# Patient Record
Sex: MALE | Race: BLACK OR AFRICAN AMERICAN | NOT HISPANIC OR LATINO | Employment: UNEMPLOYED | URBAN - METROPOLITAN AREA
[De-identification: names, ages, dates, MRNs, and addresses within clinical notes are randomized per-mention and may not be internally consistent; named-entity substitution may affect disease eponyms.]

---

## 2017-07-12 ENCOUNTER — OFFICE VISIT (OUTPATIENT)
Dept: FAMILY MEDICINE | Facility: CLINIC | Age: 27
End: 2017-07-12
Payer: COMMERCIAL

## 2017-07-12 VITALS
TEMPERATURE: 96.3 F | SYSTOLIC BLOOD PRESSURE: 106 MMHG | BODY MASS INDEX: 25.17 KG/M2 | HEART RATE: 63 BPM | RESPIRATION RATE: 18 BRPM | OXYGEN SATURATION: 98 % | DIASTOLIC BLOOD PRESSURE: 77 MMHG | WEIGHT: 182 LBS

## 2017-07-12 DIAGNOSIS — M25.532 LEFT WRIST PAIN: Primary | ICD-10-CM

## 2017-07-12 PROCEDURE — 99213 OFFICE O/P EST LOW 20 MIN: CPT | Performed by: FAMILY MEDICINE

## 2017-07-12 NOTE — MR AVS SNAPSHOT
After Visit Summary   7/12/2017    Patrick Francisco    MRN: [id number]           Patient Information     Date Of Birth          1990        Visit Information        Provider Department      7/12/2017 11:20 AM Shruti Eli MD [facility]        Today's Diagnoses     Left wrist pain    -  1       Follow-ups after your visit        Additional Services     [facility] PT, HAND, AND CHIROPRACTIC REFERRAL       **This order will print in the [facility] Scheduling Office**    Physical Therapy, Hand Therapy and Chiropractic Care are available through:    *[facility]  *[facility]  *[facility]    Call one number to schedule at any of the above locations: [phone].    Your provider has referred you to: Hand Therapy    Indication/Reason for Referral: Wrist Pain  Onset of Illness: June 2016  Therapy Orders: Evaluate and Treat  Special Programs: None  Special Request: None    Jeffry Teran      Additional Comments for the Therapist or Chiropractor: none    Please be aware that coverage of these services is subject to the terms and limitations of your health insurance plan.  Call member services at your health plan with any benefit or coverage questions.      Please bring the following to your appointment:    *Your personal calendar for scheduling future appointments  *Comfortable clothing            ORTHO  REFERRAL       [facility] is referring you to the Orthopedic  Services at [facility].       The  Representative will assist you in the coordination of your Orthopedic and Musculoskeletal Care as prescribed by your physician.    The  Representative will call you within 1 business day to help schedule your appointment, or you may contact the  Representative at:    All areas ~ [phone]     Type of Referral : Non Surgical       Timeframe requested:  "Routine    Coverage of these services is subject to the terms and limitations of your health insurance plan.  Please call member services at your health plan with any benefit or coverage questions.      If X-rays, CT or MRI's have been performed, please contact the facility where they were done to arrange for , prior to your scheduled appointment.  Please bring this referral request to your appointment and present it to your specialist.                  Who to contact     If you have questions or need follow up information about today's clinic visit or your schedule please contact [facility] directly at [phone].  Normal or non-critical lab and imaging results will be communicated to you by Mouth Food[employer]art, letter or phone within 4 business days after the clinic has received the results. If you do not hear from us within 7 days, please contact the clinic through [employer] or phone. If you have a critical or abnormal lab result, we will notify you by phone as soon as possible.  Submit refill requests through JournallyMe or call your pharmacy and they will forward the refill request to us. Please allow 3 business days for your refill to be completed.          Additional Information About Your Visit        MyChart Information     JournallyMe lets you send messages to your doctor, view your test results, renew your prescriptions, schedule appointments and more. To sign up, go to [email or web address] . Click on \"Log in\" on the left side of the screen, which will take you to the Welcome page. Then click on \"Sign up Now\" on the right side of the page.     You will be asked to enter the access code listed below, as well as some personal information. Please follow the directions to create your username and password.     Your access code is: [email or web address]  Expires: 2017 10:27 AM     Your access code will  in 90 days. If you need help or a new code, please call your [place] clinic or [phone].      "   Care EveryWhere ID     This is your Care EveryWhere ID. This could be used by other organizations to access your [facility] medical records  [id number]        Your Vitals Were     Pulse Temperature Respirations Pulse Oximetry BMI (Body Mass Index)       63 96.3  F (35.7  C) (Tympanic) 18 98% 25.17 kg/m2        Blood Pressure from Last 3 Encounters:   07/12/17 106/77   08/15/16 110/70   03/04/10 110/58    Weight from Last 3 Encounters:   07/12/17 182 lb (82.6 kg)   08/15/16 170 lb (77.1 kg)   03/04/10 171 lb 12 oz (77.9 kg)              We Performed the Following     [facility] PT, HAND, AND CHIROPRACTIC REFERRAL     ORTHO  REFERRAL          Today's Medication Changes          These changes are accurate as of: 7/12/17 11:58 AM.  If you have any questions, ask your nurse or doctor.               Start taking these medicines.        Dose/Directions    order for DME   Used for:  Left wrist pain   Started by:  Shruti Eli MD        Wrist splint with thumb   Quantity:  1 Device   Refills:  0            Where to get your medicines      Some of these will need a paper prescription and others can be bought over the counter.  Ask your nurse if you have questions.     Bring a paper prescription for each of these medications     order for DME                Primary Care Provider Office Phone # Fax #    Ab Jose Irwin Wegener, [phone]       [place] [id number]        Equal Access to Services     NILAM BURDEN AH: Hadii rossy anguloo Soalice, waaxda luqadaha, qaybta kaalmada gilson, waxvitor nadine paredes. So [facility] [phone].    ATENCIÓN: Si habla español, tiene a wilburn disposición servicios gratuitos de asistencia lingüística. Llame al [phone].    We comply with applicable federal civil rights laws and [place] laws. We do not discriminate on the basis of race, color, national origin, age, disability sex, sexual orientation or  gender identity.            Thank you!     Thank you for choosing [facility]  for your care. Our goal is always to provide you with excellent care. Hearing back from our patients is one way we can continue to improve our services. Please take a few minutes to complete the written survey that you may receive in the mail after your visit with us. Thank you!             Your Updated Medication List - Protect others around you: Learn how to safely use, store and throw away your medicines at www.disposemymeds.org.          This list is accurate as of: 7/12/17 11:58 AM.  Always use your most recent med list.                   Brand Name Dispense Instructions for use Diagnosis    * CYCLOBENZAPRINE HCL PO      Take 8 mg by mouth At Bedtime        * CYCLOBENZAPRINE HCL PO      Take 10 mg by mouth 3 times daily        GABAPENTIN PO      Take 300 mg by mouth 2 times daily        ibuprofen 600 MG tablet    ADVIL/MOTRIN    90 Tab    TAKE ONE TABLET 3 TIMES A DAY WITH FOOD as you need it    Pain in toe, Knee pain, Pain in thoracic spine       NAPROXEN PO      Take 800 mg by mouth 2 times daily (with meals)        NO ACTIVE MEDICATIONS      .        order for DME     1 Device    Wrist splint with thumb    Left wrist pain       TRAMADOL HCL PO      Take 50 mg by mouth 3 times daily        * Notice:  This list has 2 medication(s) that are the same as other medications prescribed for you. Read the directions carefully, and ask your doctor or other care provider to review them with you.
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
## 2017-08-17 ENCOUNTER — OFFICE VISIT (OUTPATIENT)
Dept: FAMILY MEDICINE | Facility: CLINIC | Age: 27
End: 2017-08-17
Payer: COMMERCIAL

## 2017-08-17 VITALS
OXYGEN SATURATION: 98 % | HEIGHT: 72 IN | DIASTOLIC BLOOD PRESSURE: 81 MMHG | SYSTOLIC BLOOD PRESSURE: 133 MMHG | TEMPERATURE: 97.2 F | BODY MASS INDEX: 25.19 KG/M2 | HEART RATE: 86 BPM | RESPIRATION RATE: 16 BRPM | WEIGHT: 186 LBS

## 2017-08-17 DIAGNOSIS — J04.0 LARYNGITIS: Primary | ICD-10-CM

## 2017-08-17 DIAGNOSIS — Z91.09 ENVIRONMENTAL ALLERGIES: ICD-10-CM

## 2017-08-17 DIAGNOSIS — J20.9 ACUTE BRONCHITIS, UNSPECIFIED ORGANISM: ICD-10-CM

## 2017-08-17 PROCEDURE — 99213 OFFICE O/P EST LOW 20 MIN: CPT | Performed by: FAMILY MEDICINE

## 2017-08-17 RX ORDER — LORATADINE 10 MG/1
10 TABLET ORAL DAILY
Qty: 90 TABLET | Refills: 3 | Status: SHIPPED | OUTPATIENT
Start: 2017-08-17

## 2017-08-17 RX ORDER — PSEUDOEPHEDRINE HCL 30 MG
30 TABLET ORAL EVERY 4 HOURS PRN
Qty: 30 TABLET | Refills: 0 | Status: SHIPPED | OUTPATIENT
Start: 2017-08-17 | End: 2023-04-09

## 2017-08-17 RX ORDER — ALBUTEROL SULFATE 90 UG/1
2 AEROSOL, METERED RESPIRATORY (INHALATION) EVERY 6 HOURS PRN
Qty: 1 INHALER | Refills: 1 | Status: SHIPPED | OUTPATIENT
Start: 2017-08-17 | End: 2017-10-06

## 2017-08-17 NOTE — MR AVS SNAPSHOT
"              After Visit Summary   8/17/2017    Patrick Francisco    MRN: [id number]           Patient Information     Date Of Birth          1990        Visit Information        Provider Department      8/17/2017 1:20 PM Savanna Slade MD [facility]        Today's Diagnoses     Laryngitis    -  1    Environmental allergies        Acute bronchitis, unspecified organism          Care Instructions    Things to try to help your throat:   1. Liquid Acetaminophen or Ibuprofen (if unable to swallow pills)  2. Salt water gargles (1 tsp salt in quart warm water) as often as desired  3. Diet of smooth, slippery and wet foods   1. Ice cream   2. Jell-O  4. Sucking candies (e.g. butterscotch)   5. Herbal tea containing \"Demulcents\" (e.g. Throat coat teas/herbal remedies) , containing elm bark, licorice root, marshmallow root    NOTE: Topical anesthetics NOT recommended as you may feel MORE pain when they wear off, for instance chloraseptic throat spray, benzocaine throat lozenges.            Follow-ups after your visit        Who to contact     If you have questions or need follow up information about today's clinic visit or your schedule please contact [facility] directly at [phone].  Normal or non-critical lab and imaging results will be communicated to you by Bountiihart, letter or phone within 4 business days after the clinic has received the results. If you do not hear from us within 7 days, please contact the clinic through Bountii[employer] or phone. If you have a critical or abnormal lab result, we will notify you by phone as soon as possible.  Submit refill requests through "[employer]" or call your pharmacy and they will forward the refill request to us. Please allow 3 business days for your refill to be completed.          Additional Information About Your Visit        BountiiharNutzvieh24 Information     "[employer]" lets you send messages to your doctor, view your test results, renew your prescriptions, " "schedule appointments and more. To sign up, go to [email or web address] . Click on \"Log in\" on the left side of the screen, which will take you to the Welcome page. Then click on \"Sign up Now\" on the right side of the page.     You will be asked to enter the access code listed below, as well as some personal information. Please follow the directions to create your username and password.     Your access code is: [email or web address]  Expires: 2017 10:27 AM     Your access code will  in 90 days. If you need help or a new code, please call your [facility] clinic or [phone].        Care EveryWhere ID     This is your Care EveryWhere ID. This could be used by other organizations to access your [facility] medical records  [id number]        Your Vitals Were     Pulse Temperature Respirations Height Pulse Oximetry BMI (Body Mass Index)    86 97.2  F (36.2  C) (Tympanic) 16 6' (1.829 m) 98% 25.23 kg/m2       Blood Pressure from Last 3 Encounters:   17 133/81   17 106/77   08/15/16 110/70    Weight from Last 3 Encounters:   17 186 lb (84.4 kg)   17 182 lb (82.6 kg)   08/15/16 170 lb (77.1 kg)              Today, you had the following     No orders found for display         Today's Medication Changes          These changes are accurate as of: 17  2:25 PM.  If you have any questions, ask your nurse or doctor.               Start taking these medicines.        Dose/Directions    albuterol 108 (90 BASE) MCG/ACT Inhaler   Commonly known as:  PROAIR HFA/PROVENTIL HFA/VENTOLIN HFA   Used for:  Acute bronchitis, unspecified organism   Started by:  Savanna Slade MD        Dose:  2 puff   Inhale 2 puffs into the lungs every 6 hours as needed for shortness of breath / dyspnea or wheezing   Quantity:  1 Inhaler   Refills:  1       loratadine 10 MG tablet   Commonly known as:  CLARITIN   Used for:  Laryngitis, Environmental allergies   Started by:  Savanna Slade MD        Dose:  10 " mg   Take 1 tablet (10 mg) by mouth daily   Quantity:  90 tablet   Refills:  3       pseudoePHEDrine 30 MG tablet   Commonly known as:  SUDAFED   Used for:  Laryngitis, Environmental allergies   Started by:  Savanna Slade MD        Dose:  30 mg   Take 1 tablet (30 mg) by mouth every 4 hours as needed for congestion   Quantity:  30 tablet   Refills:  0            Where to get your medicines      These medications were sent to [facility] [address] [place] [address] AT [address] [place] [id number]     Phone:  [phone]     albuterol 108 (90 BASE) MCG/ACT Inhaler    loratadine 10 MG tablet         Some of these will need a paper prescription and others can be bought over the counter.  Ask your nurse if you have questions.     Bring a paper prescription for each of these medications     pseudoePHEDrine 30 MG tablet                Primary Care Provider Office Phone # Fax #    Joel Daniel Irwin Wegener, [phone] [address] [place] [address]        Equal Access to Services     [facility] AH: Hadii rossy ku hadasho Soalice, waaxda luqadaha, qaybta kaalmada adeegyaalireza, mary bowers . So [facility] [phone].    ATENCIÓN: Si habla español, tiene a wilburn disposición servicios gratuitos de asistencia lingüística. [facility] [phone].    We comply with applicable federal civil rights laws and [place] laws. We do not discriminate on the basis of race, color, national origin, age, disability sex, sexual orientation or gender identity.            Thank you!     Thank you for choosing [facility]  for your care. Our goal is always to provide you with excellent care. Hearing back from our patients is one way we can continue to improve our services. Please take a few minutes to complete the written survey that you may receive in the mail after your visit with us. Thank you!              Your Updated Medication List - Protect others around you: Learn how to safely use, store and throw away your medicines at www.disposemymeds.org.          This list is accurate as of: 8/17/17  2:25 PM.  Always use your most recent med list.                   Brand Name Dispense Instructions for use Diagnosis    albuterol 108 (90 BASE) MCG/ACT Inhaler    PROAIR HFA/PROVENTIL HFA/VENTOLIN HFA    1 Inhaler    Inhale 2 puffs into the lungs every 6 hours as needed for shortness of breath / dyspnea or wheezing    Acute bronchitis, unspecified organism       * CYCLOBENZAPRINE HCL PO      Take 8 mg by mouth At Bedtime        * CYCLOBENZAPRINE HCL PO      Take 10 mg by mouth 3 times daily        GABAPENTIN PO      Take 300 mg by mouth 2 times daily        ibuprofen 600 MG tablet    ADVIL/MOTRIN    90 Tab    TAKE ONE TABLET 3 TIMES A DAY WITH FOOD as you need it    Pain in toe, Knee pain, Pain in thoracic spine       loratadine 10 MG tablet    CLARITIN    90 tablet    Take 1 tablet (10 mg) by mouth daily    Laryngitis, Environmental allergies       NAPROXEN PO      Take 800 mg by mouth 2 times daily (with meals)        order for DME     1 Device    Wrist splint with thumb    Left wrist pain       pseudoePHEDrine 30 MG tablet    SUDAFED    30 tablet    Take 1 tablet (30 mg) by mouth every 4 hours as needed for congestion    Laryngitis, Environmental allergies       TRAMADOL HCL PO      Take 50 mg by mouth 3 times daily        * Notice:  This list has 2 medication(s) that are the same as other medications prescribed for you. Read the directions carefully, and ask your doctor or other care provider to review them with you.

## 2017-08-17 NOTE — PROGRESS NOTES
SUBJECTIVE:   Patrick Francisco is a 27 year old male who presents to clinic today for the following health issues:      ED/UC Followup: Laryngitis, cough    Facility:  [facility]   Date of visit: 08/09/17  Reason for visit: Cough  Current Status: still having but not as much. Can still feel something back there on throat.   Moderating factors: Robitussin, albuterol are working well to control cough    One week ago he started having severe, spasmodic cough productive of white phlegm, associated with very husky voice, and feeling like something was in the back of the throat.    Some allergy symptoms, itchy eyes, chronic runny nose    History: very distant history of similar symptoms many years ago        Allergies   Allergen Reactions     Mushroom      Hives and his skin feels like it is burning, and an itchy throat      Social History   Substance Use Topics     Smoking status: Current Every Day Smoker     Packs/day: 0.50     Years: 9.00     Types: Cigarettes     Smokeless tobacco: Never Used     Alcohol use Yes      Past Medical History:   Diagnosis Date     NO ACTIVE PROBLEMS       /81 (BP Location: Right arm, Patient Position: Chair, Cuff Size: Adult Large)  Pulse 86  Temp 97.2  F (36.2  C) (Tympanic)  Resp 16  Ht 6' (1.829 m)  Wt 186 lb (84.4 kg)  SpO2 98%  BMI 25.23 kg/m2   OBJECTIVE:  He appears well, vital signs are as noted by the nurse. Ears normal.  Throat irritated appearing with red patches, no tonsillar exudate.  Neck supple. No adenopathy in the neck. Nose is congested. Sinuses non tender. The chest is clear, without wheezes or rales.    ASSESSMENT / PLAN:  (J04.0) Laryngitis  (primary encounter diagnosis)  Comment: see orders  Please see patient instructions below.     Plan: loratadine (CLARITIN) 10 MG tablet,         pseudoePHEDrine (SUDAFED) 30 MG tablet            (Z91.09) Environmental allergies  Comment: new diagnosis, recommended starting antiallergy meds, see order  Plan:  "loratadine (CLARITIN) 10 MG tablet,         pseudoePHEDrine (SUDAFED) 30 MG tablet            (J20.9) Acute bronchitis, unspecified organism  Comment: albuterol helping, The current medical regimen is effective;  continue present plan and medications. Renewed today.  Plan: albuterol (PROAIR HFA/PROVENTIL HFA/VENTOLIN         HFA) 108 (90 BASE) MCG/ACT Inhaler              Patient Instructions   Things to try to help your throat:   1. Liquid Acetaminophen or Ibuprofen (if unable to swallow pills)  2. Salt water gargles (1 tsp salt in quart warm water) as often as desired  3. Diet of smooth, slippery and wet foods   1. Ice cream   2. Jell-O  4. Sucking candies (e.g. butterscotch)   5. Herbal tea containing \"Demulcents\" (e.g. Throat coat teas/herbal remedies) , containing elm bark, licorice root, marshmallow root    NOTE: Topical anesthetics NOT recommended as you may feel MORE pain when they wear off, for instance chloraseptic throat spray, benzocaine throat lozenges.        "

## 2017-08-17 NOTE — PATIENT INSTRUCTIONS
"Things to try to help your throat:   1. Liquid Acetaminophen or Ibuprofen (if unable to swallow pills)  2. Salt water gargles (1 tsp salt in quart warm water) as often as desired  3. Diet of smooth, slippery and wet foods   1. Ice cream   2. Jell-O  4. Sucking candies (e.g. butterscotch)   5. Herbal tea containing \"Demulcents\" (e.g. Throat coat teas/herbal remedies) , containing elm bark, licorice root, marshmallow root    NOTE: Topical anesthetics NOT recommended as you may feel MORE pain when they wear off, for instance chloraseptic throat spray, benzocaine throat lozenges.    "

## 2017-10-06 DIAGNOSIS — J20.9 ACUTE BRONCHITIS, UNSPECIFIED ORGANISM: ICD-10-CM

## 2017-10-06 NOTE — TELEPHONE ENCOUNTER
Medication Detail      Disp Refills Start End FELISA   albuterol (PROAIR HFA/PROVENTIL HFA/VENTOLIN HFA) 108 (90 BASE) MCG/ACT Inhaler 1 Inhaler 1 8/17/2017  --   Sig: Inhale 2 puffs into the lungs every 6 hours as needed for shortness of breath / dyspnea or wheezing   Class: E-Prescribe   Route: Inhalation   Order: [id number]        Last Office Visit with [facility], [facility] or [facility] prescribing provider:  8/17/2017   Future Office Visit:       Date of Last Asthma Action Plan Letter:   There are no preventive care reminders to display for this patient.   Asthma Control Test: No flowsheet data found.    Date of Last Spirometry Test:   No results found for this or any previous visit.

## 2017-10-09 RX ORDER — ALBUTEROL SULFATE 90 UG/1
AEROSOL, METERED RESPIRATORY (INHALATION)
Qty: 18 G | Refills: 0 | Status: SHIPPED | OUTPATIENT
Start: 2017-10-09

## 2020-01-09 ENCOUNTER — TELEPHONE (OUTPATIENT)
Dept: OTHER | Facility: CLINIC | Age: 30
End: 2020-01-09

## 2020-05-30 ENCOUNTER — APPOINTMENT (OUTPATIENT)
Dept: GENERAL RADIOLOGY | Facility: CLINIC | Age: 30
End: 2020-05-30
Attending: EMERGENCY MEDICINE
Payer: COMMERCIAL

## 2020-05-30 ENCOUNTER — HOSPITAL ENCOUNTER (EMERGENCY)
Facility: CLINIC | Age: 30
Discharge: HOME | End: 2020-05-30
Attending: EMERGENCY MEDICINE | Admitting: EMERGENCY MEDICINE
Payer: COMMERCIAL

## 2020-05-30 VITALS
BODY MASS INDEX: 23.03 KG/M2 | OXYGEN SATURATION: 100 % | SYSTOLIC BLOOD PRESSURE: 110 MMHG | DIASTOLIC BLOOD PRESSURE: 66 MMHG | RESPIRATION RATE: 16 BRPM | HEIGHT: 72 IN | WEIGHT: 170 LBS | TEMPERATURE: 97.7 F | HEART RATE: 61 BPM

## 2020-05-30 DIAGNOSIS — T58.91XA TOXIC EFFECT OF CARBON MONOXIDE, UNINTENTIONAL, INITIAL ENCOUNTER: ICD-10-CM

## 2020-05-30 DIAGNOSIS — T59.811A SMOKE INHALATION: ICD-10-CM

## 2020-05-30 DIAGNOSIS — Z77.29 CARBON MONOXIDE EXPOSURE: ICD-10-CM

## 2020-05-30 LAB
ANION GAP SERPL CALCULATED.3IONS-SCNC: 10 MMOL/L (ref 3–14)
BASE DEFICIT BLDV-SCNC: 4.6 MMOL/L
BASOPHILS # BLD AUTO: 0 10E9/L (ref 0–0.2)
BASOPHILS NFR BLD AUTO: 0.2 %
BUN SERPL-MCNC: 20 MG/DL (ref 7–30)
CALCIUM SERPL-MCNC: 8 MG/DL (ref 8.5–10.1)
CHLORIDE SERPL-SCNC: 104 MMOL/L (ref 94–109)
CO2 SERPL-SCNC: 22 MMOL/L (ref 20–32)
COHGB MFR BLD: 5.4 % (ref 0–2)
CREAT SERPL-MCNC: 1.15 MG/DL (ref 0.66–1.25)
DIFFERENTIAL METHOD BLD: ABNORMAL
EOSINOPHIL # BLD AUTO: 0.1 10E9/L (ref 0–0.7)
EOSINOPHIL NFR BLD AUTO: 1.5 %
ERYTHROCYTE [DISTWIDTH] IN BLOOD BY AUTOMATED COUNT: 13.4 % (ref 10–15)
GFR SERPL CREATININE-BSD FRML MDRD: 84 ML/MIN/{1.73_M2}
GLUCOSE SERPL-MCNC: 87 MG/DL (ref 70–99)
HCO3 BLDV-SCNC: 22 MMOL/L (ref 21–28)
HCT VFR BLD AUTO: 39.8 % (ref 40–53)
HGB BLD-MCNC: 13.8 G/DL (ref 13.3–17.7)
IMM GRANULOCYTES # BLD: 0 10E9/L (ref 0–0.4)
IMM GRANULOCYTES NFR BLD: 0.2 %
LYMPHOCYTES # BLD AUTO: 1.9 10E9/L (ref 0.8–5.3)
LYMPHOCYTES NFR BLD AUTO: 35.4 %
MCH RBC QN AUTO: 31.8 PG (ref 26.5–33)
MCHC RBC AUTO-ENTMCNC: 34.7 G/DL (ref 31.5–36.5)
MCV RBC AUTO: 92 FL (ref 78–100)
MONOCYTES # BLD AUTO: 0.5 10E9/L (ref 0–1.3)
MONOCYTES NFR BLD AUTO: 10.1 %
NEUTROPHILS # BLD AUTO: 2.8 10E9/L (ref 1.6–8.3)
NEUTROPHILS NFR BLD AUTO: 52.6 %
NRBC # BLD AUTO: 0 10*3/UL
NRBC BLD AUTO-RTO: 0 /100
O2/TOTAL GAS SETTING VFR VENT: ABNORMAL %
PCO2 BLDV: 42 MM HG (ref 40–50)
PH BLDV: 7.32 PH (ref 7.32–7.43)
PLATELET # BLD AUTO: 257 10E9/L (ref 150–450)
PO2 BLDV: 379 MM HG (ref 25–47)
POTASSIUM SERPL-SCNC: 3.3 MMOL/L (ref 3.4–5.3)
RBC # BLD AUTO: 4.34 10E12/L (ref 4.4–5.9)
SODIUM SERPL-SCNC: 136 MMOL/L (ref 133–144)
WBC # BLD AUTO: 5.4 10E9/L (ref 4–11)

## 2020-05-30 PROCEDURE — 99285 EMERGENCY DEPT VISIT HI MDM: CPT | Mod: 25

## 2020-05-30 PROCEDURE — 71045 X-RAY EXAM CHEST 1 VIEW: CPT

## 2020-05-30 PROCEDURE — 80048 BASIC METABOLIC PNL TOTAL CA: CPT | Performed by: EMERGENCY MEDICINE

## 2020-05-30 PROCEDURE — 73130 X-RAY EXAM OF HAND: CPT | Mod: LT

## 2020-05-30 PROCEDURE — 85025 COMPLETE CBC W/AUTO DIFF WBC: CPT | Performed by: EMERGENCY MEDICINE

## 2020-05-30 PROCEDURE — 90715 TDAP VACCINE 7 YRS/> IM: CPT | Performed by: EMERGENCY MEDICINE

## 2020-05-30 PROCEDURE — 94640 AIRWAY INHALATION TREATMENT: CPT

## 2020-05-30 PROCEDURE — 25000125 ZZHC RX 250: Performed by: EMERGENCY MEDICINE

## 2020-05-30 PROCEDURE — 90471 IMMUNIZATION ADMIN: CPT

## 2020-05-30 PROCEDURE — 82803 BLOOD GASES ANY COMBINATION: CPT | Performed by: EMERGENCY MEDICINE

## 2020-05-30 PROCEDURE — 25000128 H RX IP 250 OP 636: Performed by: EMERGENCY MEDICINE

## 2020-05-30 PROCEDURE — 82375 ASSAY CARBOXYHB QUANT: CPT | Performed by: EMERGENCY MEDICINE

## 2020-05-30 RX ORDER — POTASSIUM CHLORIDE 1500 MG/1
20 TABLET, EXTENDED RELEASE ORAL 2 TIMES DAILY
Qty: 14 TABLET | Refills: 0 | Status: SHIPPED | OUTPATIENT
Start: 2020-05-30 | End: 2020-06-06

## 2020-05-30 RX ORDER — IPRATROPIUM BROMIDE AND ALBUTEROL SULFATE 2.5; .5 MG/3ML; MG/3ML
3 SOLUTION RESPIRATORY (INHALATION) ONCE
Status: COMPLETED | OUTPATIENT
Start: 2020-05-30 | End: 2020-05-30

## 2020-05-30 RX ADMIN — IPRATROPIUM BROMIDE AND ALBUTEROL SULFATE 3 ML: .5; 3 SOLUTION RESPIRATORY (INHALATION) at 09:46

## 2020-05-30 RX ADMIN — CLOSTRIDIUM TETANI TOXOID ANTIGEN (FORMALDEHYDE INACTIVATED), CORYNEBACTERIUM DIPHTHERIAE TOXOID ANTIGEN (FORMALDEHYDE INACTIVATED), BORDETELLA PERTUSSIS TOXOID ANTIGEN (GLUTARALDEHYDE INACTIVATED), BORDETELLA PERTUSSIS FILAMENTOUS HEMAGGLUTININ ANTIGEN (FORMALDEHYDE INACTIVATED), BORDETELLA PERTUSSIS PERTACTIN ANTIGEN, AND BORDETELLA PERTUSSIS FIMBRIAE 2/3 ANTIGEN 0.5 ML: 5; 2; 2.5; 5; 3; 5 INJECTION, SUSPENSION INTRAMUSCULAR at 13:02

## 2020-05-30 ASSESSMENT — MIFFLIN-ST. JEOR: SCORE: 1764.11
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
## 2020-05-30 NOTE — DISCHARGE INSTRUCTIONS
Please return to the emergency department as needed for new or worsening symptoms including severe confusion, vomiting and unable to keep anything down, fainting, worsening shortness of breath, any other concerning symptoms.

## 2020-05-30 NOTE — ED AVS SNAPSHOT
Emergency Department  [address] [place] [address]  Phone:  [phone]  Fax:  [phone]                                    Patrick Francisco   MRN: [id number]    Department:   Emergency Department   Date of Visit:  5/30/2020           After Visit Summary Signature Page    I have received my discharge instructions, and my questions have been answered. I have discussed any challenges I see with this plan with the nurse or doctor.    ..........................................................................................................................................  Patient/Patient Representative Signature      ..........................................................................................................................................  Patient Representative Print Name and Relationship to Patient    ..................................................               ................................................  Date                                   Time    ..........................................................................................................................................  Reviewed by Signature/Title    ...................................................              ..............................................  Date                                               Time          22EPIC Rev 08/18
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
## 2023-04-03 ENCOUNTER — LAB (OUTPATIENT)
Dept: LAB | Facility: CLINIC | Age: 33
End: 2023-04-03
Payer: COMMERCIAL

## 2023-04-03 ENCOUNTER — OFFICE VISIT (OUTPATIENT)
Dept: INTERNAL MEDICINE | Facility: CLINIC | Age: 33
End: 2023-04-03
Payer: COMMERCIAL

## 2023-04-03 VITALS
OXYGEN SATURATION: 98 % | SYSTOLIC BLOOD PRESSURE: 127 MMHG | DIASTOLIC BLOOD PRESSURE: 76 MMHG | WEIGHT: 202.4 LBS | BODY MASS INDEX: 27.41 KG/M2 | HEART RATE: 63 BPM | HEIGHT: 72 IN

## 2023-04-03 DIAGNOSIS — F41.9 ANXIETY: ICD-10-CM

## 2023-04-03 DIAGNOSIS — F41.0 PANIC ATTACK: ICD-10-CM

## 2023-04-03 DIAGNOSIS — F41.9 ANXIETY: Primary | ICD-10-CM

## 2023-04-03 LAB
ALBUMIN SERPL BCG-MCNC: 4.3 G/DL (ref 3.5–5.2)
ANION GAP SERPL CALCULATED.3IONS-SCNC: 8 MMOL/L (ref 7–15)
BUN SERPL-MCNC: 10.2 MG/DL (ref 6–20)
CALCIUM SERPL-MCNC: 9.2 MG/DL (ref 8.6–10)
CHLORIDE SERPL-SCNC: 102 MMOL/L (ref 98–107)
CREAT SERPL-MCNC: 1.05 MG/DL (ref 0.67–1.17)
DEPRECATED HCO3 PLAS-SCNC: 26 MMOL/L (ref 22–29)
GFR SERPL CREATININE-BSD FRML MDRD: >90 ML/MIN/1.73M2
GLUCOSE SERPL-MCNC: 98 MG/DL (ref 70–99)
PHOSPHATE SERPL-MCNC: 3.3 MG/DL (ref 2.5–4.5)
POTASSIUM SERPL-SCNC: 4.5 MMOL/L (ref 3.4–5.3)
SODIUM SERPL-SCNC: 136 MMOL/L (ref 136–145)
TSH SERPL DL<=0.005 MIU/L-ACNC: 2.44 UIU/ML (ref 0.3–4.2)

## 2023-04-03 PROCEDURE — 84443 ASSAY THYROID STIM HORMONE: CPT | Performed by: PATHOLOGY

## 2023-04-03 PROCEDURE — 99203 OFFICE O/P NEW LOW 30 MIN: CPT | Performed by: HOSPITALIST

## 2023-04-03 PROCEDURE — 36415 COLL VENOUS BLD VENIPUNCTURE: CPT | Performed by: PATHOLOGY

## 2023-04-03 PROCEDURE — 80069 RENAL FUNCTION PANEL: CPT | Performed by: PATHOLOGY

## 2023-04-03 RX ORDER — PROPRANOLOL HYDROCHLORIDE 20 MG/1
20 TABLET ORAL 2 TIMES DAILY PRN
Qty: 60 TABLET | Refills: 1 | Status: SHIPPED | OUTPATIENT
Start: 2023-04-03 | End: 2024-08-27

## 2023-04-03 ASSESSMENT — ANXIETY QUESTIONNAIRES
GAD7 TOTAL SCORE: 7
1. FEELING NERVOUS, ANXIOUS, OR ON EDGE: MORE THAN HALF THE DAYS
6. BECOMING EASILY ANNOYED OR IRRITABLE: NOT AT ALL
5. BEING SO RESTLESS THAT IT IS HARD TO SIT STILL: SEVERAL DAYS
2. NOT BEING ABLE TO STOP OR CONTROL WORRYING: SEVERAL DAYS
GAD7 TOTAL SCORE: 7
7. FEELING AFRAID AS IF SOMETHING AWFUL MIGHT HAPPEN: SEVERAL DAYS
3. WORRYING TOO MUCH ABOUT DIFFERENT THINGS: SEVERAL DAYS
IF YOU CHECKED OFF ANY PROBLEMS ON THIS QUESTIONNAIRE, HOW DIFFICULT HAVE THESE PROBLEMS MADE IT FOR YOU TO DO YOUR WORK, TAKE CARE OF THINGS AT HOME, OR GET ALONG WITH OTHER PEOPLE: SOMEWHAT DIFFICULT

## 2023-04-03 ASSESSMENT — PATIENT HEALTH QUESTIONNAIRE - PHQ9
5. POOR APPETITE OR OVEREATING: SEVERAL DAYS
SUM OF ALL RESPONSES TO PHQ QUESTIONS 1-9: 14

## 2023-04-03 NOTE — PATIENT INSTRUCTIONS
- Propranolol 20mg twice a day as needed for panic attacks.   - Referral to counselor.   - Labs Renal panel and TSH today.   - Keep up with exercises.     Follow up again in 2 month.

## 2023-04-03 NOTE — PROGRESS NOTES
----- Message from SARITA Anderson sent at 9/30/2019  7:27 AM CDT -----  Has uti.  Needs Macrobid 100 mg po bid for 5 days. # 10 NR.   Assessment/Plan  Problem List Items Addressed This Visit        Other    Anxiety - Primary     - Propranolol 20mg twice a day as needed for panic attacks.   - Referral to counselor.   - Labs Renal panel and TSH today.   - Keep up with exercises.          Relevant Orders    Adult Mental Health  Referral    Renal panel (Completed)    TSH with free T4 reflex (Completed)    Panic attack    Relevant Medications    propranolol (INDERAL) 20 MG tablet       No results found for any visits on 04/03/23.    Health Maintenance Due   Topic Date Due     NICOTINE/TOBACCO CESSATION COUNSELING Q 1 YR  Never done     ADVANCE CARE PLANNING  Never done     Pneumococcal Vaccine: Pediatrics (0 to 5 Years) and At-Risk Patients (6 to 64 Years) (1 - PCV) Never done     HIV SCREENING  Never done     HEPATITIS C SCREENING  Never done     YEARLY PREVENTIVE VISIT  03/04/2011     COVID-19 Vaccine (2 - Pfizer series) 04/20/2022     INFLUENZA VACCINE (1) 09/01/2022       Subjective  Has not had any opioid use and was on suboxone for 3-4 months into October of last year. Mentions he was abusing fentanyl before. No longer has interest. Then developed cigarettes to 2 packs every 3 days, now down to cigarette of 1 per day. Now smoking socially. Smokes marijuana every other weekend, he thinks it helps with focus. Does drink alcohol socially 1-2 drinks, possibly once a month. Has also had cut back on caffeine, now he has stopped regular use. He does exercise during the week.     Mentions he hasn't been feeling depressed lately, but did in the past. Mentions he was having irregular heart beat, had gone to [employer] on a heart monitor. Was having some anxiety also. No stresses that he can identify. Does still have chest discomfort. Does have underlying anxiety that is present.  Does get moments where it is hard to focus, has to breathing it through. This had been more last year but lately has been every other day.     He does take care of kids  and trying to work. Is an  and a licensed .       Review of Systems   Constitutional: Negative for chills and fever.   Respiratory: Negative for shortness of breath.    Cardiovascular: Positive for chest pain and palpitations. Negative for peripheral edema.   Gastrointestinal: Negative for abdominal pain, constipation and diarrhea.   Genitourinary: Negative for dysuria and frequency.   Musculoskeletal: Negative for arthralgias.   Skin: Negative for rash.   Psychiatric/Behavioral: The patient is nervous/anxious.        History  Past Medical History:   Diagnosis Date     History of opioid abuse (H)      Seasonal allergic rhinitis     mainly in the fall season       Past Surgical History:   Procedure Laterality Date     SURGICAL HISTORY OF -       root canals       Family History   Problem Relation Age of Onset     Alcoholism Mother      Allergies Mother         pollen, dust     Depression Mother      Alcoholism Father      Allergies Sister         shellfish     Depression Sister      Asthma Maternal Grandmother      Hypertension Maternal Grandmother      Depression Maternal Grandmother      Gastrointestinal Disease Maternal Grandmother         ulcers     Asthma Maternal Uncle      Allergies Maternal Uncle         shellfish     Cancer Maternal Uncle         great uncle, throat cancer     Asthma Other         some maternal cousins     Allergies Other         pickles, and mushrooms     Depression Other         self       Social History     Tobacco Use     Smoking status: Every Day     Packs/day: 0.50     Years: 9.00     Pack years: 4.50     Types: Cigarettes     Smokeless tobacco: Never     Tobacco comments:     Smokes down to socially now. Does use marijuana every other weekend.   Vaping Use     Vaping status: Not on file   Substance Use Topics     Alcohol use: Yes     Comment: May drink once a month, social. Has had 2 moments of alcohol poisoning. Drinks 1-2 drinks at a time.     "    Objective  /76 (BP Location: Right arm, Patient Position: Sitting, Cuff Size: Adult Regular)   Pulse 63   Ht 1.829 m (6' 0.01\")   Wt 91.8 kg (202 lb 6.4 oz)   SpO2 98%   BMI 27.44 kg/m    Vitals taken by Oniel Francisco MD    Physical Exam  Constitutional:       General: He is not in acute distress.     Appearance: He is not ill-appearing or toxic-appearing.   HENT:      Head: Normocephalic.   Eyes:      Conjunctiva/sclera: Conjunctivae normal.   Cardiovascular:      Rate and Rhythm: Regular rhythm.      Heart sounds: Normal heart sounds.   Pulmonary:      Effort: Pulmonary effort is normal. No respiratory distress.      Breath sounds: Normal breath sounds. No wheezing, rhonchi or rales.   Abdominal:      General: Bowel sounds are normal. There is no distension.      Palpations: Abdomen is soft.      Tenderness: There is no abdominal tenderness.   Musculoskeletal:      Right lower leg: No edema.      Left lower leg: No edema.   Skin:     Findings: No rash.   Neurological:      Mental Status: He is alert.   Psychiatric:         Mood and Affect: Mood normal.         Thought Content: Thought content normal.         25 minutes spent on the date of the encounter doing chart review, history and exam, documentation and further activities per the note.      Return in about 2 months (around 6/3/2023).      Oniel Francisco MD  [facility] INTERNAL MEDICINE [place]    "

## 2023-04-03 NOTE — NURSING NOTE
"Patrick Francisco is a 32 year old male patient that presents today in clinic for the following:    Chief Complaint   Patient presents with     Establish Care     Physical     Anxiety     Pt reports issues with anxiety     The patient's allergies and medications were reviewed as noted. A set of vitals were recorded as noted without incident: /76 (BP Location: Right arm, Patient Position: Sitting, Cuff Size: Adult Regular)   Pulse 63   Ht 1.829 m (6' 0.01\")   Wt 91.8 kg (202 lb 6.4 oz)   SpO2 98%   BMI 27.44 kg/m  . The patient does not have any other questions for the provider.    Colby Madera, EMT 3:26 PM on 4/3/2023   "

## 2023-04-09 PROBLEM — F41.0 PANIC ATTACK: Status: ACTIVE | Noted: 2023-04-09

## 2023-04-09 PROBLEM — J20.9 ACUTE BRONCHITIS, UNSPECIFIED ORGANISM: Status: RESOLVED | Noted: 2017-08-17 | Resolved: 2023-04-09

## 2023-04-09 PROBLEM — F41.9 ANXIETY: Status: ACTIVE | Noted: 2023-04-09

## 2023-04-09 ASSESSMENT — ENCOUNTER SYMPTOMS
SHORTNESS OF BREATH: 0
FEVER: 0
ABDOMINAL PAIN: 0
DIARRHEA: 0
PALPITATIONS: 1
CONSTIPATION: 0
NERVOUS/ANXIOUS: 1
CHILLS: 0
ARTHRALGIAS: 0
FREQUENCY: 0
DYSURIA: 0

## 2023-04-09 NOTE — ASSESSMENT & PLAN NOTE
- Propranolol 20mg twice a day as needed for panic attacks.   - Referral to counselor.   - Labs Renal panel and TSH today.   - Keep up with exercises.

## 2023-06-02 ENCOUNTER — HEALTH MAINTENANCE LETTER (OUTPATIENT)
Age: 33
End: 2023-06-02

## 2024-06-30 ENCOUNTER — HEALTH MAINTENANCE LETTER (OUTPATIENT)
Age: 34
End: 2024-06-30

## 2024-08-27 ENCOUNTER — HOSPITAL ENCOUNTER (EMERGENCY)
Facility: CLINIC | Age: 34
Discharge: HOME | End: 2024-08-27
Attending: STUDENT IN AN ORGANIZED HEALTH CARE EDUCATION/TRAINING PROGRAM | Admitting: STUDENT IN AN ORGANIZED HEALTH CARE EDUCATION/TRAINING PROGRAM
Payer: COMMERCIAL

## 2024-08-27 ENCOUNTER — APPOINTMENT (OUTPATIENT)
Dept: GENERAL RADIOLOGY | Facility: CLINIC | Age: 34
End: 2024-08-27
Attending: STUDENT IN AN ORGANIZED HEALTH CARE EDUCATION/TRAINING PROGRAM
Payer: COMMERCIAL

## 2024-08-27 VITALS
HEART RATE: 67 BPM | WEIGHT: 202 LBS | BODY MASS INDEX: 27.36 KG/M2 | RESPIRATION RATE: 20 BRPM | OXYGEN SATURATION: 100 % | SYSTOLIC BLOOD PRESSURE: 133 MMHG | DIASTOLIC BLOOD PRESSURE: 95 MMHG | HEIGHT: 72 IN | TEMPERATURE: 97.4 F

## 2024-08-27 DIAGNOSIS — T67.5XXA HEAT EXHAUSTION, INITIAL ENCOUNTER: ICD-10-CM

## 2024-08-27 DIAGNOSIS — N17.9 AKI (ACUTE KIDNEY INJURY) (H): ICD-10-CM

## 2024-08-27 DIAGNOSIS — M62.82 NON-TRAUMATIC RHABDOMYOLYSIS: ICD-10-CM

## 2024-08-27 LAB
ALBUMIN SERPL BCG-MCNC: 4.7 G/DL (ref 3.5–5.2)
ALP SERPL-CCNC: 85 U/L (ref 40–150)
ALT SERPL W P-5'-P-CCNC: 27 U/L (ref 0–70)
ANION GAP SERPL CALCULATED.3IONS-SCNC: 12 MMOL/L (ref 7–15)
ANION GAP SERPL CALCULATED.3IONS-SCNC: 19 MMOL/L (ref 7–15)
AST SERPL W P-5'-P-CCNC: 26 U/L (ref 0–45)
ATRIAL RATE - MUSE: 54 BPM
BASOPHILS # BLD AUTO: 0 10E3/UL (ref 0–0.2)
BASOPHILS NFR BLD AUTO: 0 %
BILIRUB SERPL-MCNC: 1.1 MG/DL
BUN SERPL-MCNC: 20.9 MG/DL (ref 6–20)
BUN SERPL-MCNC: 23.4 MG/DL (ref 6–20)
CALCIUM SERPL-MCNC: 8.4 MG/DL (ref 8.8–10.4)
CALCIUM SERPL-MCNC: 9.9 MG/DL (ref 8.8–10.4)
CHLORIDE SERPL-SCNC: 102 MMOL/L (ref 98–107)
CHLORIDE SERPL-SCNC: 96 MMOL/L (ref 98–107)
CK SERPL-CCNC: 382 U/L (ref 39–308)
CREAT SERPL-MCNC: 1.3 MG/DL (ref 0.67–1.17)
CREAT SERPL-MCNC: 1.42 MG/DL (ref 0.67–1.17)
DIASTOLIC BLOOD PRESSURE - MUSE: NORMAL MMHG
EGFRCR SERPLBLD CKD-EPI 2021: 66 ML/MIN/1.73M2
EGFRCR SERPLBLD CKD-EPI 2021: 73 ML/MIN/1.73M2
EOSINOPHIL # BLD AUTO: 0 10E3/UL (ref 0–0.7)
EOSINOPHIL NFR BLD AUTO: 0 %
ERYTHROCYTE [DISTWIDTH] IN BLOOD BY AUTOMATED COUNT: 13.8 % (ref 10–15)
GLUCOSE SERPL-MCNC: 106 MG/DL (ref 70–99)
GLUCOSE SERPL-MCNC: 99 MG/DL (ref 70–99)
HCO3 SERPL-SCNC: 18 MMOL/L (ref 22–29)
HCO3 SERPL-SCNC: 20 MMOL/L (ref 22–29)
HCT VFR BLD AUTO: 49.9 % (ref 40–53)
HGB BLD-MCNC: 17.4 G/DL (ref 13.3–17.7)
IMM GRANULOCYTES # BLD: 0 10E3/UL
IMM GRANULOCYTES NFR BLD: 0 %
INTERPRETATION ECG - MUSE: NORMAL
LIPASE SERPL-CCNC: 17 U/L (ref 13–60)
LYMPHOCYTES # BLD AUTO: 1.1 10E3/UL (ref 0.8–5.3)
LYMPHOCYTES NFR BLD AUTO: 13 %
MCH RBC QN AUTO: 31.5 PG (ref 26.5–33)
MCHC RBC AUTO-ENTMCNC: 34.9 G/DL (ref 31.5–36.5)
MCV RBC AUTO: 90 FL (ref 78–100)
MONOCYTES # BLD AUTO: 0.8 10E3/UL (ref 0–1.3)
MONOCYTES NFR BLD AUTO: 9 %
NEUTROPHILS # BLD AUTO: 6.6 10E3/UL (ref 1.6–8.3)
NEUTROPHILS NFR BLD AUTO: 77 %
NRBC # BLD AUTO: 0 10E3/UL
NRBC BLD AUTO-RTO: 0 /100
P AXIS - MUSE: 79 DEGREES
PLATELET # BLD AUTO: 286 10E3/UL (ref 150–450)
POTASSIUM SERPL-SCNC: 4.3 MMOL/L (ref 3.4–5.3)
POTASSIUM SERPL-SCNC: 4.4 MMOL/L (ref 3.4–5.3)
PR INTERVAL - MUSE: 156 MS
PROT SERPL-MCNC: 8.9 G/DL (ref 6.4–8.3)
QRS DURATION - MUSE: 78 MS
QT - MUSE: 416 MS
QTC - MUSE: 394 MS
R AXIS - MUSE: 85 DEGREES
RBC # BLD AUTO: 5.53 10E6/UL (ref 4.4–5.9)
SODIUM SERPL-SCNC: 133 MMOL/L (ref 135–145)
SODIUM SERPL-SCNC: 134 MMOL/L (ref 135–145)
SYSTOLIC BLOOD PRESSURE - MUSE: NORMAL MMHG
T AXIS - MUSE: 71 DEGREES
TROPONIN T SERPL HS-MCNC: 13 NG/L
VENTRICULAR RATE- MUSE: 54 BPM
WBC # BLD AUTO: 8.5 10E3/UL (ref 4–11)

## 2024-08-27 PROCEDURE — 258N000003 HC RX IP 258 OP 636: Performed by: STUDENT IN AN ORGANIZED HEALTH CARE EDUCATION/TRAINING PROGRAM

## 2024-08-27 PROCEDURE — 71046 X-RAY EXAM CHEST 2 VIEWS: CPT

## 2024-08-27 PROCEDURE — 83690 ASSAY OF LIPASE: CPT | Performed by: STUDENT IN AN ORGANIZED HEALTH CARE EDUCATION/TRAINING PROGRAM

## 2024-08-27 PROCEDURE — 96360 HYDRATION IV INFUSION INIT: CPT | Performed by: STUDENT IN AN ORGANIZED HEALTH CARE EDUCATION/TRAINING PROGRAM

## 2024-08-27 PROCEDURE — 99285 EMERGENCY DEPT VISIT HI MDM: CPT | Mod: 25 | Performed by: STUDENT IN AN ORGANIZED HEALTH CARE EDUCATION/TRAINING PROGRAM

## 2024-08-27 PROCEDURE — 84484 ASSAY OF TROPONIN QUANT: CPT | Performed by: STUDENT IN AN ORGANIZED HEALTH CARE EDUCATION/TRAINING PROGRAM

## 2024-08-27 PROCEDURE — 93005 ELECTROCARDIOGRAM TRACING: CPT | Performed by: STUDENT IN AN ORGANIZED HEALTH CARE EDUCATION/TRAINING PROGRAM

## 2024-08-27 PROCEDURE — 36415 COLL VENOUS BLD VENIPUNCTURE: CPT | Performed by: STUDENT IN AN ORGANIZED HEALTH CARE EDUCATION/TRAINING PROGRAM

## 2024-08-27 PROCEDURE — 80053 COMPREHEN METABOLIC PANEL: CPT | Performed by: STUDENT IN AN ORGANIZED HEALTH CARE EDUCATION/TRAINING PROGRAM

## 2024-08-27 PROCEDURE — 82550 ASSAY OF CK (CPK): CPT | Performed by: STUDENT IN AN ORGANIZED HEALTH CARE EDUCATION/TRAINING PROGRAM

## 2024-08-27 PROCEDURE — 85025 COMPLETE CBC W/AUTO DIFF WBC: CPT | Performed by: STUDENT IN AN ORGANIZED HEALTH CARE EDUCATION/TRAINING PROGRAM

## 2024-08-27 PROCEDURE — 96361 HYDRATE IV INFUSION ADD-ON: CPT | Performed by: STUDENT IN AN ORGANIZED HEALTH CARE EDUCATION/TRAINING PROGRAM

## 2024-08-27 RX ADMIN — SODIUM CHLORIDE 2000 ML: 9 INJECTION, SOLUTION INTRAVENOUS at 09:35

## 2024-08-27 ASSESSMENT — ACTIVITIES OF DAILY LIVING (ADL)
ADLS_ACUITY_SCORE: 35

## 2024-08-27 ASSESSMENT — COLUMBIA-SUICIDE SEVERITY RATING SCALE - C-SSRS
1. IN THE PAST MONTH, HAVE YOU WISHED YOU WERE DEAD OR WISHED YOU COULD GO TO SLEEP AND NOT WAKE UP?: NO
6. HAVE YOU EVER DONE ANYTHING, STARTED TO DO ANYTHING, OR PREPARED TO DO ANYTHING TO END YOUR LIFE?: NO
2. HAVE YOU ACTUALLY HAD ANY THOUGHTS OF KILLING YOURSELF IN THE PAST MONTH?: NO

## 2024-08-27 NOTE — ED TRIAGE NOTES
"Pt reports having painful \"spasms\" across his entire body that started last night.     Triage Assessment (Adult)       Row Name 08/27/24 0904          Triage Assessment    Airway WDL WDL        Respiratory WDL    Respiratory WDL WDL        Skin Circulation/Temperature WDL    Skin Circulation/Temperature WDL WDL        Cardiac WDL    Cardiac WDL WDL        Peripheral/Neurovascular WDL    Peripheral Neurovascular WDL WDL        Cognitive/Neuro/Behavioral WDL    Cognitive/Neuro/Behavioral WDL WDL                     "

## 2024-08-27 NOTE — ED PROVIDER NOTES
Emergency Department Note      History of Present Illness     Chief Complaint   Spasms    HPI   Patrick Francisco is a 34 year old male with history of hypertension who presents for muscle spasms. Patient reports that he works at the [employer] and it was warm yesterday so he was drinking extra water. Around 5:00pm, he started to feel painful spasms in his chest that later radiated into his abdomen, legs, back, back of his head, and his wrists. He says that he felt nauseas, confused, and he was diaphoretic this morning due to the pain. Patient says that he felt feverish last night. He reports that he works at a [employer] and there is good ventilation in the building. No double or blurry vision, vomiting, or urinary symtoms and he says that his urine has not been more dark than usual. No recent cuts or scrapes and he says that his Tdap vaccination is up-to-date. Patient reports that he recently stopped using tobacco but still occasionally drinks alcohol and he had one drink last night before 5:00pm.    Independent Historian   None    Review of External Notes   None     Past Medical History     Medical History and Problem List   Opioid abuse  Seasonal allergies   Anxiety  Depression   Panic attack  Hypertension   Gastritis and gastroduodenitis with hemorrhage gastritis and gastroduodenitis with hemorrhage     Medications   Remeron     Physical Exam     Patient Vitals for the past 24 hrs:   BP Temp Temp src Pulse Resp SpO2 Height Weight   08/27/24 1200 (!) 133/95 -- -- 67 20 -- -- --   08/27/24 1130 (!) 144/89 -- -- 68 10 -- -- --   08/27/24 1100 (!) 150/90 -- -- 68 (!) 7 -- -- --   08/27/24 1030 (!) 146/95 -- -- 62 18 -- -- --   08/27/24 0903 (!) 141/98 97.4  F (36.3  C) Temporal 83 18 100 % 1.829 m (6') 91.6 kg (202 lb)     Physical Exam  General: Awake, alert, in no acute distress   HEENT: Atraumatic   EOM normal   External ears normal   Trachea midline  Neck: Supple, normal ROM  CV: Regular rate,  regular rhythm   No lower extremity edema  2+ radial and DP pulses  PULM: Breath sounds normal bilaterally  No wheezes or rales  ABD: Soft, non-tender, non-distended  Normal bowel sounds   No rebound or guarding   MSK: No gross deformities, compartments are soft  NEURO: Alert, no focal deficits, CN 2-12 in-tact, moving all four extremities   Skin: Warm, dry and intact     Diagnostics     Lab Results   Labs Ordered and Resulted from Time of ED Arrival to Time of ED Departure   COMPREHENSIVE METABOLIC PANEL - Abnormal       Result Value    Sodium 133 (*)     Potassium 4.3      Carbon Dioxide (CO2) 18 (*)     Anion Gap 19 (*)     Urea Nitrogen 23.4 (*)     Creatinine 1.42 (*)     GFR Estimate 66      Calcium 9.9      Chloride 96 (*)     Glucose 106 (*)     Alkaline Phosphatase 85      AST 26      ALT 27      Protein Total 8.9 (*)     Albumin 4.7      Bilirubin Total 1.1     CK TOTAL - Abnormal     (*)    BASIC METABOLIC PANEL - Abnormal    Sodium 134 (*)     Potassium 4.4      Chloride 102      Carbon Dioxide (CO2) 20 (*)     Anion Gap 12      Urea Nitrogen 20.9 (*)     Creatinine 1.30 (*)     GFR Estimate 73      Calcium 8.4 (*)     Glucose 99     LIPASE - Normal    Lipase 17     TROPONIN T, HIGH SENSITIVITY - Normal    Troponin T, High Sensitivity 13     CBC WITH PLATELETS AND DIFFERENTIAL    WBC Count 8.5      RBC Count 5.53      Hemoglobin 17.4      Hematocrit 49.9      MCV 90      MCH 31.5      MCHC 34.9      RDW 13.8      Platelet Count 286      % Neutrophils 77      % Lymphocytes 13      % Monocytes 9      % Eosinophils 0      % Basophils 0      % Immature Granulocytes 0      NRBCs per 100 WBC 0      Absolute Neutrophils 6.6      Absolute Lymphocytes 1.1      Absolute Monocytes 0.8      Absolute Eosinophils 0.0      Absolute Basophils 0.0      Absolute Immature Granulocytes 0.0      Absolute NRBCs 0.0       Imaging   XR Chest 2 Views   Final Result   IMPRESSION: No acute cardiopulmonary disease.       WILLIE QUINONES MD            SYSTEM ID:  [id number]        EKG   ECG results from 08/27/24   EKG 12-lead, tracing only     Value    Systolic Blood Pressure     Diastolic Blood Pressure     Ventricular Rate 54    Atrial Rate 54    NY Interval 156    QRS Duration 78        QTc 394    P Axis 79    R AXIS 85    T Axis 71    Interpretation ECG      Sinus bradycardia  Otherwise normal ECG  No previous ECGs available      Independent Interpretation   None    ED Course      Medications Administered   Medications   sodium chloride 0.9% BOLUS 2,000 mL (0 mLs Intravenous Stopped 8/27/24 1147)     Procedures   None      Discussion of Management   None    ED Course   ED Course as of 08/27/24 1716   Tue Aug 27, 2024   0927 I evaluated the patient, obtained history, and performed a physical exam as detailed above.    1225 I rechecked on the patient and explained the plan for discharge. They are comfortable with this plan.      Additional Documentation  None    Medical Decision Making / Diagnosis     CMS Diagnoses: None    MIPS   None    MDM   Patrick Francisco is a 34 year old male who presents with the above history.  His exam is reassuring.  After 2 L of IV fluids, patient is feeling remarkably improved.  Initially had elevated anion gap acidosis with a low bicarb to 18, NAVA with elevated BUN to creatinine ratio (baseline creatinine 0.9 now 1.42).  No dysrhythmias noted on EKG.  Chest x-ray without acute findings.  After aggressive hydration his anion gap closed, kidney function is improving.  Continue to hydrate at home.  No signs of CNS involvement to suggest heatstroke.  Discharge home with continued supportive care, avoiding excessive heat exposure.  Avoid nephrotoxic agents and have kidney function rechecked with primary care doctor in the next 1 week.  All questions answered.      Disposition   The patient was discharged.     Diagnosis     ICD-10-CM    1. Heat exhaustion, initial encounter  T67.5XXA       2.  Non-traumatic rhabdomyolysis  M62.82       3. NAVA (acute kidney injury) (H24)  N17.9          Discharge Medications   Discharge Medication List as of 8/27/2024 12:30 PM        Scribe Disclosure:  I, Jessica Pickering, am serving as a scribe at 9:21 AM on 8/27/2024 to document services personally performed by Dagmar Yusuf DO based on my observations and the provider's statements to me.        Dagmar Yusuf DO  08/27/24 1716

## 2024-08-27 NOTE — DISCHARGE INSTRUCTIONS
Do not take ibuprofen, or Aleve.  Continue to hydrate.  Avoid alcohol for the next couple of days.  Please have your kidney function rechecked with your primary care doctor in the next 1 week.  If you have recurrent muscle spasms, dark appearing urine, please seek medical attention.

## 2025-07-13 ENCOUNTER — HEALTH MAINTENANCE LETTER (OUTPATIENT)
Age: 35
End: 2025-07-13